# Patient Record
Sex: FEMALE | Race: BLACK OR AFRICAN AMERICAN
[De-identification: names, ages, dates, MRNs, and addresses within clinical notes are randomized per-mention and may not be internally consistent; named-entity substitution may affect disease eponyms.]

---

## 2017-09-08 ENCOUNTER — HOSPITAL ENCOUNTER (INPATIENT)
Dept: HOSPITAL 84 - D.OPS | Age: 82
LOS: 1 days | Discharge: HOME | DRG: 252 | End: 2017-09-09
Attending: INTERNAL MEDICINE | Admitting: INTERNAL MEDICINE
Payer: MEDICARE

## 2017-09-08 VITALS
HEIGHT: 61 IN | BODY MASS INDEX: 31.53 KG/M2 | HEIGHT: 61 IN | BODY MASS INDEX: 31.53 KG/M2 | BODY MASS INDEX: 31.53 KG/M2 | WEIGHT: 167 LBS | WEIGHT: 167 LBS

## 2017-09-08 DIAGNOSIS — D64.9: ICD-10-CM

## 2017-09-08 DIAGNOSIS — E11.22: ICD-10-CM

## 2017-09-08 DIAGNOSIS — N18.6: ICD-10-CM
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
## 2017-09-08 DIAGNOSIS — Z87.891: ICD-10-CM

## 2017-09-08 DIAGNOSIS — Z99.2: ICD-10-CM

## 2017-09-08 DIAGNOSIS — I12.0: ICD-10-CM

## 2017-09-08 DIAGNOSIS — T82.868A: Primary | ICD-10-CM

## 2017-09-08 LAB
ANION GAP SERPL CALC-SCNC: 18 MMOL/L (ref 8–16)
APTT BLD: 28.9 SECONDS (ref 22.8–39.4)
BASOPHILS NFR BLD AUTO: 0.5 % (ref 0–2)
BUN SERPL-MCNC: 32 MG/DL (ref 7–18)
CALCIUM SERPL-MCNC: 8.4 MG/DL (ref 8.5–10.1)
CHLORIDE SERPL-SCNC: 102 MMOL/L (ref 98–107)
CO2 SERPL-SCNC: 20.2 MMOL/L (ref 21–32)
CREAT SERPL-MCNC: 4.8 MG/DL (ref 0.6–1.3)
EOSINOPHIL NFR BLD: 3.8 % (ref 0–7)
ERYTHROCYTE [DISTWIDTH] IN BLOOD BY AUTOMATED COUNT: 16.1 % (ref 11.5–14.5)
GLUCOSE SERPL-MCNC: 85 MG/DL (ref 74–106)
HCT VFR BLD CALC: 36.9 % (ref 36–48)
HGB BLD-MCNC: 11.7 G/DL (ref 12–16)
IMM GRANULOCYTES NFR BLD: 0.2 % (ref 0–5)
INR PPP: 0.94 (ref 0.85–1.17)
LYMPHOCYTES NFR BLD AUTO: 40.4 % (ref 15–50)
MCH RBC QN AUTO: 29.4 PG (ref 26–34)
MCHC RBC AUTO-ENTMCNC: 31.7 G/DL (ref 31–37)
MCV RBC: 92.7 FL (ref 80–100)
MONOCYTES NFR BLD: 4.3 % (ref 2–11)
NEUTROPHILS NFR BLD AUTO: 50.8 % (ref 40–80)
OSMOLALITY SERPL CALC.SUM OF ELEC: 277 MOSM/KG (ref 275–300)
PLATELET # BLD: 159 10X3/UL (ref 130–400)
PMV BLD AUTO: 10 FL (ref 7.4–10.4)
POTASSIUM SERPL-SCNC: 4.2 MMOL/L (ref 3.5–5.1)
PROTHROMBIN TIME: 12.4 SECONDS (ref 11.6–15)
RBC # BLD AUTO: 3.98 10X6/UL (ref 4–5.4)
SODIUM SERPL-SCNC: 136 MMOL/L (ref 136–145)
WBC # BLD AUTO: 6 10X3/UL (ref 4.8–10.8)

## 2017-09-08 PROCEDURE — 02HV33Z INSERTION OF INFUSION DEVICE INTO SUPERIOR VENA CAVA, PERCUTANEOUS APPROACH: ICD-10-PCS | Performed by: SURGERY

## 2017-09-08 PROCEDURE — B5131ZZ FLUOROSCOPY OF RIGHT JUGULAR VEINS USING LOW OSMOLAR CONTRAST: ICD-10-PCS | Performed by: SURGERY

## 2017-09-08 PROCEDURE — 03180JD BYPASS LEFT BRACHIAL ARTERY TO UPPER ARM VEIN WITH SYNTHETIC SUBSTITUTE, OPEN APPROACH: ICD-10-PCS | Performed by: SURGERY

## 2017-09-08 PROCEDURE — 037Y3ZZ DILATION OF UPPER ARTERY, PERCUTANEOUS APPROACH: ICD-10-PCS | Performed by: SURGERY

## 2017-09-08 PROCEDURE — 05743ZZ DILATION OF LEFT INNOMINATE VEIN, PERCUTANEOUS APPROACH: ICD-10-PCS | Performed by: SURGERY

## 2017-09-08 PROCEDURE — B5181ZZ FLUOROSCOPY OF SUPERIOR VENA CAVA USING LOW OSMOLAR CONTRAST: ICD-10-PCS | Performed by: SURGERY

## 2017-09-08 PROCEDURE — B548ZZA ULTRASONOGRAPHY OF SUPERIOR VENA CAVA, GUIDANCE: ICD-10-PCS | Performed by: SURGERY

## 2017-09-08 PROCEDURE — 03180KF BYPASS LEFT BRACHIAL ARTERY TO LOWER ARM VEIN WITH NONAUTOLOGOUS TISSUE SUBSTITUTE, OPEN APPROACH: ICD-10-PCS | Performed by: SURGERY

## 2017-09-08 PROCEDURE — 05PYX3Z REMOVAL OF INFUSION DEVICE FROM UPPER VEIN, EXTERNAL APPROACH: ICD-10-PCS | Performed by: SURGERY

## 2017-09-09 VITALS — SYSTOLIC BLOOD PRESSURE: 142 MMHG | DIASTOLIC BLOOD PRESSURE: 67 MMHG

## 2017-09-09 VITALS — SYSTOLIC BLOOD PRESSURE: 140 MMHG | DIASTOLIC BLOOD PRESSURE: 48 MMHG

## 2017-09-09 PROCEDURE — 5A1D00Z: ICD-10-PCS | Performed by: INTERNAL MEDICINE

## 2017-11-04 ENCOUNTER — HOSPITAL ENCOUNTER (INPATIENT)
Dept: HOSPITAL 84 - D.M2 | Age: 82
LOS: 3 days | Discharge: HOME | DRG: 252 | End: 2017-11-07
Attending: INTERNAL MEDICINE | Admitting: INTERNAL MEDICINE
Payer: MEDICARE

## 2017-11-04 VITALS
WEIGHT: 174 LBS | WEIGHT: 174 LBS | BODY MASS INDEX: 32.85 KG/M2 | BODY MASS INDEX: 32.85 KG/M2 | BODY MASS INDEX: 32.85 KG/M2 | HEIGHT: 61 IN | HEIGHT: 61 IN

## 2017-11-04 DIAGNOSIS — E11.22: ICD-10-CM

## 2017-11-04 DIAGNOSIS — Z87.891: ICD-10-CM

## 2017-11-04 DIAGNOSIS — I12.0: ICD-10-CM

## 2017-11-04 DIAGNOSIS — T82.868A: Primary | ICD-10-CM

## 2017-11-04 DIAGNOSIS — Z99.2: ICD-10-CM
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
## 2017-11-04 DIAGNOSIS — N18.6: ICD-10-CM

## 2017-11-05 VITALS — DIASTOLIC BLOOD PRESSURE: 76 MMHG | SYSTOLIC BLOOD PRESSURE: 169 MMHG

## 2017-11-05 VITALS — SYSTOLIC BLOOD PRESSURE: 168 MMHG | DIASTOLIC BLOOD PRESSURE: 74 MMHG

## 2017-11-05 VITALS — DIASTOLIC BLOOD PRESSURE: 53 MMHG | SYSTOLIC BLOOD PRESSURE: 167 MMHG

## 2017-11-05 VITALS — DIASTOLIC BLOOD PRESSURE: 64 MMHG | SYSTOLIC BLOOD PRESSURE: 150 MMHG

## 2017-11-05 VITALS — DIASTOLIC BLOOD PRESSURE: 71 MMHG | SYSTOLIC BLOOD PRESSURE: 160 MMHG

## 2017-11-05 LAB
ANION GAP SERPL CALC-SCNC: 21.7 MMOL/L (ref 8–16)
APTT BLD: 31.6 SECONDS (ref 22.8–39.4)
BASOPHILS NFR BLD AUTO: 0.2 % (ref 0–2)
BUN SERPL-MCNC: 66 MG/DL (ref 7–18)
CALCIUM SERPL-MCNC: 8.3 MG/DL (ref 8.5–10.1)
CHLORIDE SERPL-SCNC: 102 MMOL/L (ref 98–107)
CO2 SERPL-SCNC: 17.9 MMOL/L (ref 21–32)
CREAT SERPL-MCNC: 8 MG/DL (ref 0.6–1.3)
EOSINOPHIL NFR BLD: 5.9 % (ref 0–7)
ERYTHROCYTE [DISTWIDTH] IN BLOOD BY AUTOMATED COUNT: 19.4 % (ref 11.5–14.5)
GLUCOSE SERPL-MCNC: 74 MG/DL (ref 74–106)
HCT VFR BLD CALC: 29.8 % (ref 36–48)
HGB BLD-MCNC: 9.8 G/DL (ref 12–16)
IMM GRANULOCYTES NFR BLD: 0.2 % (ref 0–5)
INR PPP: 1.01 (ref 0.85–1.17)
LYMPHOCYTES NFR BLD AUTO: 32.7 % (ref 15–50)
MCH RBC QN AUTO: 31.2 PG (ref 26–34)
MCHC RBC AUTO-ENTMCNC: 32.9 G/DL (ref 31–37)
MCV RBC: 94.9 FL (ref 80–100)
MONOCYTES NFR BLD: 7.5 % (ref 2–11)
NEUTROPHILS NFR BLD AUTO: 53.5 % (ref 40–80)
OSMOLALITY SERPL CALC.SUM OF ELEC: 291 MOSM/KG (ref 275–300)
PLATELET # BLD: 193 10X3/UL (ref 130–400)
PMV BLD AUTO: 9.5 FL (ref 7.4–10.4)
POTASSIUM SERPL-SCNC: 4.6 MMOL/L (ref 3.5–5.1)
PROTHROMBIN TIME: 13.2 SECONDS (ref 11.6–15)
RBC # BLD AUTO: 3.14 10X6/UL (ref 4–5.4)
SODIUM SERPL-SCNC: 137 MMOL/L (ref 136–145)
WBC # BLD AUTO: 8.3 10X3/UL (ref 4.8–10.8)

## 2017-11-05 PROCEDURE — B31J1ZZ FLUOROSCOPY OF LEFT UPPER EXTREMITY ARTERIES USING LOW OSMOLAR CONTRAST: ICD-10-PCS | Performed by: SURGERY

## 2017-11-05 PROCEDURE — B51W1ZZ FLUOROSCOPY OF DIALYSIS SHUNT/FISTULA USING LOW OSMOLAR CONTRAST: ICD-10-PCS | Performed by: SURGERY

## 2017-11-05 PROCEDURE — 03C83ZZ EXTIRPATION OF MATTER FROM LEFT BRACHIAL ARTERY, PERCUTANEOUS APPROACH: ICD-10-PCS | Performed by: SURGERY

## 2017-11-05 PROCEDURE — 03783ZZ DILATION OF LEFT BRACHIAL ARTERY, PERCUTANEOUS APPROACH: ICD-10-PCS | Performed by: SURGERY

## 2017-11-05 NOTE — NUR
RECEIVED REPORT, WILL ASSUME CARE OF PT, PT DENIES ANY NEEDS AT THIS TIME, BED
IS LOW, SRX2, SCD ARE ON, WILL CONTINUE PLAN OF CARE

## 2017-11-05 NOTE — NUR
RECIEVED REPORT ON PATIENT, PATIENT IS SLEEPING AT THIS TIME, NAD NOTED. CHEST
RISES AND FALLS EQUALLY. BED IS LOW AND LOCKED AT THIS TIME. CALL LIGHT IN
[facility]. CPOC

## 2017-11-05 NOTE — NUR
SPOKE WITH CHANDRIKA WEEMS ABOUT RIGHT ARM, SHE VERBALIZED THAT THE ARM IS OK TO
USE ON BLOOD DRAWS AND IV ACCESS. PT VERBALIZES UNDERSTANDING AND DENIES ANY
QUESTIONS OR CONCERNS. CHANDRIKA ALSO GAVE ORDERS TO DRAW A PT AND INR, AND APTT
ALSO SAID COZAAR 100MG PO DAILY AND LOPRESSOR 50 MG BID COULD BE PUT IN. WILL
CPOC

## 2017-11-05 NOTE — NUR
PATIENT VISITING WITH FAMILY. PATIENT DENIES ANY NEEDS AT THIS TIME. BED IS
LOW AND LOCKED. STATES PAIN IS OKAY RIGHT NOW INFORMED PATIENT I HAVE PAGED
THE APN WAITING ON CALL BACK FOR PAIN MEDICATION. CPOC

## 2017-11-05 NOTE — NUR
PATIENT BACK FROM SURGERY, VS: /73 HR 53, O2SAT 96% ON ROOM AIR, RR 14,
TEMP 97.0. PATIENT C/O PAIN IN L ARM 10/10. WILL GIVE PAIN MEDICATION. CPOC

## 2017-11-05 NOTE — NUR
PT ARRIVED VIA WHEELCHAIR WITH FAMILY. PUT AMBULATE TO BED. GAIT UNSTEADY. PT
LEANING OVER FOR SUPPORT OF BED. PT IS AAO. A RESERVE LEFT ARM SIGN HUNG ABOVE
BED. PT IS NPO AND VERBALIZES UNDERSTANDING. PT DENIES ANY NEEDS. PT RESTING
IN BED. NO S/S OF DISTRESS. WILL CPOC

## 2017-11-05 NOTE — NUR
PAGED THE APN ON CALL TO ASK ABOUT ORDERS AND TO CLARIFY ON WEATHER OR NOT I
CAN USE RIGHT ARM FOR BLOOD DRAWS AND IV ACCESS. PT STATES THERE IS A FISTULA
IN THAT UPPER ARM, LET LAB DRAW FROM HAND BUT WOULD NOT ALLOW ABOVE THE WRIST.
WILL CPOC

## 2017-11-06 VITALS — SYSTOLIC BLOOD PRESSURE: 149 MMHG | DIASTOLIC BLOOD PRESSURE: 49 MMHG

## 2017-11-06 VITALS — DIASTOLIC BLOOD PRESSURE: 56 MMHG | SYSTOLIC BLOOD PRESSURE: 145 MMHG

## 2017-11-06 VITALS — DIASTOLIC BLOOD PRESSURE: 59 MMHG | SYSTOLIC BLOOD PRESSURE: 151 MMHG

## 2017-11-06 VITALS — DIASTOLIC BLOOD PRESSURE: 60 MMHG | SYSTOLIC BLOOD PRESSURE: 144 MMHG

## 2017-11-06 VITALS — SYSTOLIC BLOOD PRESSURE: 147 MMHG | DIASTOLIC BLOOD PRESSURE: 58 MMHG

## 2017-11-06 PROCEDURE — 5A1D70Z PERFORMANCE OF URINARY FILTRATION, INTERMITTENT, LESS THAN 6 HOURS PER DAY: ICD-10-PCS | Performed by: INTERNAL MEDICINE

## 2017-11-06 NOTE — NUR
RECIEVED REPORT FROM MONIKA IN DIALYSIS, PULLED OFF 1 LITER, VITALS REPORTED TO
BE STABLE.  NOTIFIED CNAs THAT PT IS READY TO COME BACK TO THE FLOOR, RAGHU LEFT FLOOR TO RETIEVE PT.

## 2017-11-06 NOTE — NUR
TYLENOL 650 MG GIVEN AT PT REQUEST FOR C/O GERNERALIZED DISCOMFORT.  RATES
PAIN AT A  5 ON PAIN SCALE,  BED LOW, CL IN REACH, RESPOSITIONED IN BED FOR
COMFORT.

## 2017-11-06 NOTE — NUR
PATIENT SITTING ON SIDE OF BED WITH LUNCH TRAY. STATES BACK FEELS "MUCH
BETTER".  NO COMPLAINTS OR REQUESTS AT THIS TIME. CPOC.

## 2017-11-06 NOTE — NUR
PATIENT RESTING IN BED. UPSET ABOUT NOT HAVING DIALYSIS YET. EXPLAINED SHE
WILL HAVE DIALYSIS THIS AFTERNOON. STATES SHE WANTS TO GO HOME. ALL QUESTIONS
ANSWERED. NO OTHER REQUESTS AT THIS TIME. CALL LIGHT IN REACH. CPOC.

## 2017-11-06 NOTE — NUR
RECIEVED REPORT ON PATIENT, PATIENT IS ALERT AND ORIENTED AT THIS TIME.
PATIENT HAS A R HAND IV THAT IS SL AT THIS TIME. PATIENT HAS A L AVF WITH
THRILL AND BRUIT NOTED. BED IS LOW AND LOCKED AT THIS TIME. CPOC

## 2017-11-06 NOTE — NUR
PATIENT COMPLAINED OF BACK PAIN AND REQUEST TO SIT UP ON SIDE OF BED. TYLENOL
GIVEN. WILL CONTINUE TO MONITOR. CPOC.

## 2017-11-07 VITALS — DIASTOLIC BLOOD PRESSURE: 74 MMHG | SYSTOLIC BLOOD PRESSURE: 172 MMHG

## 2017-11-07 VITALS — DIASTOLIC BLOOD PRESSURE: 71 MMHG | SYSTOLIC BLOOD PRESSURE: 122 MMHG

## 2017-11-07 VITALS — DIASTOLIC BLOOD PRESSURE: 54 MMHG | SYSTOLIC BLOOD PRESSURE: 147 MMHG

## 2017-11-07 VITALS — SYSTOLIC BLOOD PRESSURE: 142 MMHG | DIASTOLIC BLOOD PRESSURE: 57 MMHG

## 2017-11-07 LAB — HCV AB S/CO SERPL IA: <0.1 (ref 0–0.9)

## 2017-11-07 NOTE — NUR
AM ROUNDS - PT IS IN BED AND IS AWAKE AT THIS TIME.  PT IS HARD OF HEARING.
RESERVE AT LEFT ARM, AVF.  IV TO RIGHT HAND, SL.  PT IS ON ROOM AIR.  INCONT
OF B&B.  STILL PRODUCES URINE.  PT IS UP WITH ASSIST.  NO NEEDS AT THIS TIME.
BED AT LOWEST POSITION.  CALL BELL IN USE/REACH.  SIDE RAILS UP X2.  WILL
CONTINUE TO MONITOR

## 2017-11-07 NOTE — NUR
D/C - WRITTEN AND VERBAL D/C GIVEN TO PT.  IV TO RIGTH HAND D/C, CATH TIP
INTACT, 2X2 DRESSING APPLIED AND SECURED WITH TAPE.  PT TOLERATED WELL.  PT
LEFT FLOOR VIA WHEELCHAIR WITH RN.  WILL D/C

## 2017-11-17 ENCOUNTER — HOSPITAL ENCOUNTER (INPATIENT)
Dept: HOSPITAL 84 - D.OPS | Age: 82
LOS: 3 days | Discharge: HOME | DRG: 252 | End: 2017-11-20
Attending: INTERNAL MEDICINE | Admitting: INTERNAL MEDICINE
Payer: MEDICARE

## 2017-11-17 VITALS
WEIGHT: 192.65 LBS | BODY MASS INDEX: 36.37 KG/M2 | BODY MASS INDEX: 36.37 KG/M2 | BODY MASS INDEX: 36.37 KG/M2 | HEIGHT: 61 IN | WEIGHT: 192.65 LBS | HEIGHT: 61 IN | BODY MASS INDEX: 36.37 KG/M2

## 2017-11-17 VITALS — DIASTOLIC BLOOD PRESSURE: 66 MMHG | SYSTOLIC BLOOD PRESSURE: 144 MMHG

## 2017-11-17 VITALS — DIASTOLIC BLOOD PRESSURE: 86 MMHG | SYSTOLIC BLOOD PRESSURE: 190 MMHG

## 2017-11-17 VITALS — DIASTOLIC BLOOD PRESSURE: 74 MMHG | SYSTOLIC BLOOD PRESSURE: 153 MMHG

## 2017-11-17 VITALS — SYSTOLIC BLOOD PRESSURE: 191 MMHG | DIASTOLIC BLOOD PRESSURE: 89 MMHG

## 2017-11-17 DIAGNOSIS — E11.22: ICD-10-CM

## 2017-11-17 DIAGNOSIS — N18.6: ICD-10-CM

## 2017-11-17 DIAGNOSIS — Z87.891: ICD-10-CM
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
## 2017-11-17 DIAGNOSIS — T82.868A: Primary | ICD-10-CM

## 2017-11-17 DIAGNOSIS — Z99.2: ICD-10-CM

## 2017-11-17 LAB
ANION GAP SERPL CALC-SCNC: 23.9 MMOL/L (ref 8–16)
APTT BLD: 30.4 SECONDS (ref 22.8–39.4)
BASOPHILS NFR BLD AUTO: 0.2 % (ref 0–2)
BUN SERPL-MCNC: 64 MG/DL (ref 7–18)
CALCIUM SERPL-MCNC: 7.7 MG/DL (ref 8.5–10.1)
CHLORIDE SERPL-SCNC: 105 MMOL/L (ref 98–107)
CO2 SERPL-SCNC: 20.1 MMOL/L (ref 21–32)
CREAT SERPL-MCNC: 7.9 MG/DL (ref 0.6–1.3)
EOSINOPHIL NFR BLD: 7.1 % (ref 0–7)
ERYTHROCYTE [DISTWIDTH] IN BLOOD BY AUTOMATED COUNT: 22 % (ref 11.5–14.5)
GLUCOSE SERPL-MCNC: 86 MG/DL (ref 74–106)
HCT VFR BLD CALC: 27.4 % (ref 36–48)
HGB BLD-MCNC: 9.7 G/DL (ref 12–16)
IMM GRANULOCYTES NFR BLD: 0.2 % (ref 0–5)
INR PPP: 0.97 (ref 0.85–1.17)
LYMPHOCYTES NFR BLD AUTO: 27.8 % (ref 15–50)
MCH RBC QN AUTO: 35.9 PG (ref 26–34)
MCHC RBC AUTO-ENTMCNC: 35.4 G/DL (ref 31–37)
MCV RBC: 101.5 FL (ref 80–100)
MONOCYTES NFR BLD: 5.6 % (ref 2–11)
NEUTROPHILS NFR BLD AUTO: 59.1 % (ref 40–80)
OSMOLALITY SERPL CALC.SUM OF ELEC: 303 MOSM/KG (ref 275–300)
PLATELET # BLD: 233 10X3/UL (ref 130–400)
PMV BLD AUTO: 9.2 FL (ref 7.4–10.4)
POTASSIUM SERPL-SCNC: 5 MMOL/L (ref 3.5–5.1)
PROTHROMBIN TIME: 12.8 SECONDS (ref 11.6–15)
RBC # BLD AUTO: 2.7 10X6/UL (ref 4–5.4)
SODIUM SERPL-SCNC: 144 MMOL/L (ref 136–145)
WBC # BLD AUTO: 5.9 10X3/UL (ref 4.8–10.8)

## 2017-11-17 PROCEDURE — 3E03317 INTRODUCTION OF OTHER THROMBOLYTIC INTO PERIPHERAL VEIN, PERCUTANEOUS APPROACH: ICD-10-PCS | Performed by: SURGERY

## 2017-11-17 PROCEDURE — 03783ZZ DILATION OF LEFT BRACHIAL ARTERY, PERCUTANEOUS APPROACH: ICD-10-PCS | Performed by: SURGERY

## 2017-11-17 PROCEDURE — 03C83ZZ EXTIRPATION OF MATTER FROM LEFT BRACHIAL ARTERY, PERCUTANEOUS APPROACH: ICD-10-PCS | Performed by: SURGERY

## 2017-11-17 NOTE — NUR
PT IN BED RESTING QUIETLY. C/O PAIN. TOLD PT I WILL BRING HER PRN TYLENOL.
BREATHING EVEN AND UNLABORED. BED IN LOW POSITION, CALL LIGHT WITHIN REACH.

## 2017-11-18 VITALS — SYSTOLIC BLOOD PRESSURE: 150 MMHG | DIASTOLIC BLOOD PRESSURE: 61 MMHG

## 2017-11-18 VITALS — SYSTOLIC BLOOD PRESSURE: 152 MMHG | DIASTOLIC BLOOD PRESSURE: 54 MMHG

## 2017-11-18 VITALS — SYSTOLIC BLOOD PRESSURE: 154 MMHG | DIASTOLIC BLOOD PRESSURE: 65 MMHG

## 2017-11-18 VITALS — SYSTOLIC BLOOD PRESSURE: 170 MMHG | DIASTOLIC BLOOD PRESSURE: 70 MMHG

## 2017-11-18 VITALS — SYSTOLIC BLOOD PRESSURE: 95 MMHG | DIASTOLIC BLOOD PRESSURE: 44 MMHG

## 2017-11-18 VITALS — DIASTOLIC BLOOD PRESSURE: 77 MMHG | SYSTOLIC BLOOD PRESSURE: 177 MMHG

## 2017-11-18 PROCEDURE — 5A1D70Z PERFORMANCE OF URINARY FILTRATION, INTERMITTENT, LESS THAN 6 HOURS PER DAY: ICD-10-PCS | Performed by: INTERNAL MEDICINE

## 2017-11-18 NOTE — NUR
PATIENT ALERT/ORIENT X4. OXYGEN ON AT 3L PER N/C. FISTULA TO LEFT ARM. RESERVE
LEFT ARM. RIGHT FOREARM SALINE LOCKED. PATIENT IS VERY [place]. CALL LIGHT WITHIN
REACH.

## 2017-11-18 NOTE — NUR
PT ALERT/ORIENTED AND RESTING IN BED. HER HOME MEDS HAVE NOT BEEN ASSESED YET
BY MD. SHE IS WANTING TO TAKE HER PILLS THAT SHE BROUGHT FROM HOME. REVIEWED
MEDS AND AGREED THAT PT COULD TAKE HER LOPRESSOR AND NORKO TOL[place] AND DR LAO WILL BE MADE AWARE OF MEDS NEEDING TO BE REVIEWED IN AM. PT IS VERY
[place]. SHE DID DIALYZE TODAY AND HAS A AVF TO [place]. SHE STILL MAKES SOME URINE
AND IS OFTEN INCONTINENT OF BM. CPOC. F/U ON HOME MEDS IN AM WITH SUGEY
NEPHROLOGIST.

## 2017-11-19 VITALS — DIASTOLIC BLOOD PRESSURE: 63 MMHG | SYSTOLIC BLOOD PRESSURE: 117 MMHG

## 2017-11-19 VITALS — SYSTOLIC BLOOD PRESSURE: 163 MMHG | DIASTOLIC BLOOD PRESSURE: 69 MMHG

## 2017-11-19 VITALS — DIASTOLIC BLOOD PRESSURE: 65 MMHG | SYSTOLIC BLOOD PRESSURE: 159 MMHG

## 2017-11-19 VITALS — DIASTOLIC BLOOD PRESSURE: 68 MMHG | SYSTOLIC BLOOD PRESSURE: 177 MMHG

## 2017-11-19 VITALS — DIASTOLIC BLOOD PRESSURE: 58 MMHG | SYSTOLIC BLOOD PRESSURE: 103 MMHG

## 2017-11-19 VITALS — DIASTOLIC BLOOD PRESSURE: 74 MMHG | SYSTOLIC BLOOD PRESSURE: 174 MMHG

## 2017-11-19 NOTE — OP
PATIENT NAME:  JIAN BERMUDEZ                            MEDICAL RECORD: [id number]
:35                                             LOCATION:D.     D.[id number]
                                                         ADMISSION DATE:17
SURGEON:  FAUSTINA WRIGHT MD            
 
 
DATE OF OPERATION:  2017
 
REFERRING PHYSICIAN:  Venus Hughes MD, of [place]
 
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
 
SURGEON:  Faustina Wright MD
 
OPERATION PERFORMED:  Percutaneous angiogram with AngioJet mechanical
thrombolysis, tPA pharmacothrombolysis, selective brachial artery arteriogram,
and balloon angioplasty of arterial anastomotic stenosis.
 
PREOPERATIVE NOTE:  Ms. Bermudez is a very nice 82-year-old -American
female from [place], [place], who has end-stage renal disease and is on
chronic hemodialysis.  She has been dialyzing for a while now with left upper
extremity HeRO graft.  Only last week, I performed a mechanical thrombolysis and
angioplasty procedure to reopen her thrombosed graft.  She dialyzed twice after
that down at [place]; but then, when she presented for dialysis yesterday, it
had thrombosed again.  When she went home from the hospital last time, it was my
intention that she be on Plavix, I have not had confirmation that she has been. 
If she has, I think we are going to need to add another anticoagulant such as
Coumadin; and if not, we just need to make sure that she is on Plavix this time.
 
DESCRIPTION OF PROCEDURE:  Under general anesthesia with an LMA per CRNA, the
patient was prepped and draped in sterile manner.  The Acuseal graft in her arm
was accessed with micropuncture technique.  Two opposing 6-Mozambican introducers
were placed in the body of the graft.  A Glidewire was passed through the
arterial limb and arterial anastomosis into the brachial artery and a glide
catheter followed.  Selective brachial arteriogram then demonstrated no evidence
of thrombus or occlusion of the brachial artery or branches, but there was a
high-grade stenosis at the arterial anastomosis.  I then used the AngioJet
catheter to lyse thrombus within the juxta-anastomotic segment.  The patient was
systemically heparinized with 5000 units of heparin and 2 mg of Activase was
administered into the juxta-anastomotic segment.  After sufficient dwell time,
the contents were aspirated.  I then used a 5-mm diameter high-pressure
angioplasty balloon to dilate the arterial anastomosis.  The arterial
anastomosis was dilated and the balloon was left inflated for 2 minutes, during
which time I used an AngioJet catheter and a Rajesh catheter to remove and lyse
thrombus from within the body of the HeRO graft and the outflow device all the
way to the right atrium.  When the angioplasty balloon was deflated, flow was
restored in the graft.  Repeat contrast injections with also repeat selective
brachial artery arteriogram showed no evidence of residual thrombus and
excellent flow in the access.  The patient's heparin was not reversed.  The
6-Mozambican introducers were removed and hemostasis was obtained at those sites
with some direct pressure and 4-0 Prolene figure-of-eight skin sutures.  Over
that, sterile dressings were applied and the patient was awakened from
anesthetic with a functioning AV graft and was taken to the recovery room.
 
In the recovery room, she will be given 150 mg of Plavix orally and will be
getting 75 mg of Plavix orally q.a.m. as of tomorrow morning.  She will need to
 
 
 
OPERATIVE REPORT                               [id number]    JIAN BERMUDEZ          
 
 
stay in the hospital overnight.  She will need to have dialysis here at [facility], most definitely either this evening or tomorrow, certainly before she
can go home to [place].
 
There was no blood loss during the procedure other than what was aspirated to
remove clot and debris.  About 50 cc was aspirated and discarded.  All sponges,
instruments, and needles were accounted for.  No drain was used and no surgical
specimen was submitted for histopathology.
 
TRANSINT:[id number] Voice Confirmation ID: [id number] DOCUMENT ID: [id number]
                                           
                                           FAUSTINA WRIGHT MD            
 
 
 
Electronically Signed by FAUSTINA WRIGHT on 17 at 1307
 
 
 
 
 
 
 
 
 
 
 
 
 
 
 
 
 
 
 
 
 
 
 
 
 
 
 
 
 
 
 
CC: VENUS HUGHES III MD                                       [id number]
DICTATION DATE: 17 1518     :     17 1651      ADM IN  
                                                                              
[facility]                                          
[address] [facility], [facility]

## 2017-11-19 NOTE — NUR
REPORT RECEIVED. RR EVEN AND UNALBORED, PT EXPRESSED WANT TO GO HOME TODAY.
DENIES FURTHER NEEDS, WILL CTM.

## 2017-11-19 NOTE — NUR
PT IN BED TALKING ON PHONE. RIGHT FA SALINE LOCKED. LEFT ARM RESERVE FOR
FISTULA. ORDERS FOR POSSIBLE DISCAHRGE IF OK WITH RENAL. DENIES PAIN OR
DISCOMFORT. WILL CONT TO MONITOR.

## 2017-11-20 VITALS — SYSTOLIC BLOOD PRESSURE: 159 MMHG | DIASTOLIC BLOOD PRESSURE: 71 MMHG

## 2017-11-20 VITALS — DIASTOLIC BLOOD PRESSURE: 71 MMHG | SYSTOLIC BLOOD PRESSURE: 157 MMHG

## 2017-11-20 VITALS — DIASTOLIC BLOOD PRESSURE: 64 MMHG | SYSTOLIC BLOOD PRESSURE: 156 MMHG

## 2017-11-20 VITALS — SYSTOLIC BLOOD PRESSURE: 154 MMHG | DIASTOLIC BLOOD PRESSURE: 58 MMHG

## 2017-11-20 NOTE — NUR
Patient Name: JIAN BERMUDEZ Admission Status: Elective
Accout number: [id number] Admission Date: 2017
: 1935 Admission Diagnosis:
Attending: AVILA HENDERSON Current LOS: 3
 
Anticipated DC Date: 2017
Planned Disposition: Home
Primary Insurance: MEDICARE A & B
 
 
Discharge Planning Comments:
* Is the patient Alert and Oriented? Yes 0
* How many steps to enter\exit or inside your home? NONE 0
* PCP DR. JESS CERVANTES, [place] 0
* Pharmacy [facility] IN [place] 0
* Preadmission Environment Home with Family 0
* ADLs Independent 0
* Equipment Bedside Commode
Rolling Walker
Shower Chair 0
* Other Equipment [place] [employer], - MEDICAL EQUIPMENT PROVIDER 0
* List name and contact numbers for known caregivers / representatives who
currently or will assist patient after discharge: WHITNEY COFFMAN, DTR,
[phone]
[employer], DTR, [phone] 0
* Community resources currently utilized Other 0
* Please name any agencies selected above. OUTPATIENT DIALYSIS, [place]
DIALYSIS IN [facility], 0630AM SCAT TRANSPORT ($6 ROUND TRIP) 0
* Additional services required to return to the preadmission environment? No 0
 
* Can the patient safely return to the preadmission environment? Yes 0
* Has this patient been hospitalized within the prior 30 days at any hospital?
Yes 0
 
CM MET WITH PT IN ROOM TO DISCUSS DISCHARGE PLANNING AND NEEDS. PT REPORTS
LIVING AT HOME INDEPENDENTLY WITH HER SPOUSE. PT HAS BEDSIDE COMMODE, ROLLING
WALKER AND SHOWER CHAIR FROM [place] [employer]. PT HAS NO OUTSIDE
SERVICES ASSISTING IN THE HOME. PT ATTENDS OUTPATIENT DIALYSIS IN [facility], 0630AM, SCAT BUS TRANSPORTATION. CM DISCUSSED AVAILABILITY OF HOME
HEALTH, REHAB SERVICES AND MEDICAL EQUIPMENT. PT DENIES DISCHARGE NEEDS,
REPORTS HER GRANDSON WILL PICK HER UP FOR DISCHARGE HOME. IMPORTANT MESSAGE
FROM MEDICARE PROVIDED AND EXPLAINED.
 
 
: Giuseppe Lambert

## 2017-11-20 NOTE — NUR
RECEIVED CALLBACK FROM LANDON CLEMENTE (WITH RENAL). LANDON CLEMENTE INFORMED OF DR. WILLIS D/C ORDER IN YESTERDAY (IF OK WITH NEPHROLOGY). LANDON CLEMENTE STATES DR. HENDERSON WILL BE BY AND TO CATCH HIM REGARDING THAT.

## 2017-11-20 NOTE — NUR
PER NIGHT SHIFT MATTHEW, DR. LAO HAS BEEN CALLED BY TWO DIFFERNT NURSES
REGARDING RESTARTING HOME MEDICATIONS AND DR. LAO HAS STATED TO NURSES
THAT HE WILL ADDRESS IT WHEN HE COMES IN (REFFERING TO THIS PAST WEEKEND). PER
MATTHEW, MEDICATIONS HAVE NOT BEEN RESTARTED STILL AND PT HAS BEEN TAKING OWN
MEDICAITONS. PT IS TO POSSIBLY D/C TODAY. WILL ADDRESS THIS WITH RENAL ON
CALL.

## 2017-11-20 NOTE — NUR
D/C INSTRUCTIONS EXPLAINED TO PT. D/C PAPERWORK SIGNED BY PT AND PLACED IN
CHART. IV TO RIGHT FOREARM REMOVED WITH CATH TIP INTACT. COVERED SITE WITH 2X2
GAUZE AND SECURED WITH TEGADERM (DUE TO BLEEDING, PRESSURE HELD FIRMLY).
ALEXANDER NORRIS ASSISTED PT GETTING DRESSED. PT D/C VIA WHEELCHAIR. GRANDSON IS
DRIVING PT HOME.

## 2017-11-20 NOTE — NUR
AM ROUNDING- RECEIVED REPORT FROM NIGHT SHIFT NURSE MATTHEW. PT IS CURRENTLY
LAYING IN BED ON BACK WITH EYES OPEN RESTING. PT IS HARD OF HEARING. ON ROOM
AIR. NO MONITOR. IV SEEN TO RIGHT FOREARM THAT IS CURRENTLY SALINE LOCKED.
RESERVE LEFT ARM FOR AVF. NO NEED AT THIS CURRENT TIME. WILL CONTINUE TO
MONITOR AND CONTINUE WITH PLAN OF CARE.

## 2018-12-16 ENCOUNTER — HOSPITAL ENCOUNTER (INPATIENT)
Dept: HOSPITAL 84 - D.ER | Age: 83
LOS: 6 days | Discharge: HOME | DRG: 252 | End: 2018-12-22
Attending: INTERNAL MEDICINE | Admitting: INTERNAL MEDICINE
Payer: MEDICARE

## 2018-12-16 VITALS — DIASTOLIC BLOOD PRESSURE: 92 MMHG | SYSTOLIC BLOOD PRESSURE: 150 MMHG

## 2018-12-16 VITALS
HEIGHT: 61 IN | BODY MASS INDEX: 34.69 KG/M2 | WEIGHT: 183.76 LBS | BODY MASS INDEX: 34.69 KG/M2 | HEIGHT: 61 IN | BODY MASS INDEX: 34.69 KG/M2 | WEIGHT: 183.76 LBS

## 2018-12-16 DIAGNOSIS — T82.868A: Primary | ICD-10-CM

## 2018-12-16 DIAGNOSIS — N18.6: ICD-10-CM

## 2018-12-16 DIAGNOSIS — I12.0: ICD-10-CM
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
## 2018-12-16 DIAGNOSIS — E11.22: ICD-10-CM

## 2018-12-16 LAB
ALBUMIN SERPL-MCNC: 3.7 G/DL (ref 3.4–5)
ALP SERPL-CCNC: 67 U/L (ref 46–116)
ALT SERPL-CCNC: 9 U/L (ref 10–68)
ANION GAP SERPL CALC-SCNC: 22 MMOL/L (ref 8–16)
BASOPHILS NFR BLD AUTO: 0.3 % (ref 0–2)
BILIRUB SERPL-MCNC: 0.37 MG/DL (ref 0.2–1.3)
BUN SERPL-MCNC: 66 MG/DL (ref 7–18)
CALCIUM SERPL-MCNC: 8.5 MG/DL (ref 8.5–10.1)
CHLORIDE SERPL-SCNC: 105 MMOL/L (ref 98–107)
CO2 SERPL-SCNC: 21.4 MMOL/L (ref 21–32)
CREAT SERPL-MCNC: 10.3 MG/DL (ref 0.6–1.3)
EOSINOPHIL NFR BLD: 4.8 % (ref 0–7)
ERYTHROCYTE [DISTWIDTH] IN BLOOD BY AUTOMATED COUNT: 18 % (ref 11.5–14.5)
GLOBULIN SER-MCNC: 3.7 G/L
GLUCOSE SERPL-MCNC: 94 MG/DL (ref 74–106)
HCT VFR BLD CALC: 35.3 % (ref 36–48)
HGB BLD-MCNC: 11.4 G/DL (ref 12–16)
IMM GRANULOCYTES NFR BLD: 0.4 % (ref 0–5)
LYMPHOCYTES NFR BLD AUTO: 23.9 % (ref 15–50)
MCH RBC QN AUTO: 30.2 PG (ref 26–34)
MCHC RBC AUTO-ENTMCNC: 32.3 G/DL (ref 31–37)
MCV RBC: 93.6 FL (ref 80–100)
MONOCYTES NFR BLD: 6.1 % (ref 2–11)
NEUTROPHILS NFR BLD AUTO: 64.5 % (ref 40–80)
OSMOLALITY SERPL CALC.SUM OF ELEC: 305 MOSM/KG (ref 275–300)
PLATELET # BLD: 188 10X3/UL (ref 130–400)
PMV BLD AUTO: 9.8 FL (ref 7.4–10.4)
POTASSIUM SERPL-SCNC: 4.4 MMOL/L (ref 3.5–5.1)
PROT SERPL-MCNC: 7.4 G/DL (ref 6.4–8.2)
RBC # BLD AUTO: 3.77 10X6/UL (ref 4–5.4)
SODIUM SERPL-SCNC: 144 MMOL/L (ref 136–145)
WBC # BLD AUTO: 9.9 10X3/UL (ref 4.8–10.8)

## 2018-12-17 VITALS — SYSTOLIC BLOOD PRESSURE: 143 MMHG | DIASTOLIC BLOOD PRESSURE: 74 MMHG

## 2018-12-17 VITALS — SYSTOLIC BLOOD PRESSURE: 175 MMHG | DIASTOLIC BLOOD PRESSURE: 87 MMHG

## 2018-12-17 VITALS — DIASTOLIC BLOOD PRESSURE: 66 MMHG | SYSTOLIC BLOOD PRESSURE: 128 MMHG

## 2018-12-17 VITALS — SYSTOLIC BLOOD PRESSURE: 139 MMHG | DIASTOLIC BLOOD PRESSURE: 63 MMHG

## 2018-12-17 VITALS — SYSTOLIC BLOOD PRESSURE: 141 MMHG | DIASTOLIC BLOOD PRESSURE: 62 MMHG

## 2018-12-17 VITALS — SYSTOLIC BLOOD PRESSURE: 139 MMHG | DIASTOLIC BLOOD PRESSURE: 69 MMHG

## 2018-12-17 LAB
ANION GAP SERPL CALC-SCNC: 21 MMOL/L (ref 8–16)
BASOPHILS NFR BLD AUTO: 0.1 % (ref 0–2)
BUN SERPL-MCNC: 70 MG/DL (ref 7–18)
CALCIUM SERPL-MCNC: 8 MG/DL (ref 8.5–10.1)
CHLORIDE SERPL-SCNC: 105 MMOL/L (ref 98–107)
CO2 SERPL-SCNC: 19.7 MMOL/L (ref 21–32)
CREAT SERPL-MCNC: 10.5 MG/DL (ref 0.6–1.3)
EOSINOPHIL NFR BLD: 4.5 % (ref 0–7)
ERYTHROCYTE [DISTWIDTH] IN BLOOD BY AUTOMATED COUNT: 18 % (ref 11.5–14.5)
GLUCOSE SERPL-MCNC: 74 MG/DL (ref 74–106)
HCT VFR BLD CALC: 33.6 % (ref 36–48)
HGB BLD-MCNC: 11.1 G/DL (ref 12–16)
IMM GRANULOCYTES NFR BLD: 0.5 % (ref 0–5)
INR PPP: 1.09 (ref 0.85–1.17)
LYMPHOCYTES NFR BLD AUTO: 14.8 % (ref 15–50)
MCH RBC QN AUTO: 30.2 PG (ref 26–34)
MCHC RBC AUTO-ENTMCNC: 33 G/DL (ref 31–37)
MCV RBC: 91.6 FL (ref 80–100)
MONOCYTES NFR BLD: 8.9 % (ref 2–11)
NEUTROPHILS NFR BLD AUTO: 71.2 % (ref 40–80)
OSMOLALITY SERPL CALC.SUM OF ELEC: 300 MOSM/KG (ref 275–300)
PLATELET # BLD: 162 10X3/UL (ref 130–400)
PMV BLD AUTO: 9.5 FL (ref 7.4–10.4)
POTASSIUM SERPL-SCNC: 4.7 MMOL/L (ref 3.5–5.1)
PROTHROMBIN TIME: 13.6 SECONDS (ref 11.6–15)
RBC # BLD AUTO: 3.67 10X6/UL (ref 4–5.4)
SODIUM SERPL-SCNC: 141 MMOL/L (ref 136–145)
WBC # BLD AUTO: 10 10X3/UL (ref 4.8–10.8)

## 2018-12-18 VITALS — DIASTOLIC BLOOD PRESSURE: 80 MMHG | SYSTOLIC BLOOD PRESSURE: 169 MMHG

## 2018-12-18 VITALS — DIASTOLIC BLOOD PRESSURE: 76 MMHG | SYSTOLIC BLOOD PRESSURE: 151 MMHG

## 2018-12-18 VITALS — DIASTOLIC BLOOD PRESSURE: 77 MMHG | SYSTOLIC BLOOD PRESSURE: 166 MMHG

## 2018-12-18 VITALS — DIASTOLIC BLOOD PRESSURE: 64 MMHG | SYSTOLIC BLOOD PRESSURE: 138 MMHG

## 2018-12-18 LAB
ALBUMIN SERPL-MCNC: 3.2 G/DL (ref 3.4–5)
ALP SERPL-CCNC: 59 U/L (ref 46–116)
ALT SERPL-CCNC: 8 U/L (ref 10–68)
ANION GAP SERPL CALC-SCNC: 23.1 MMOL/L (ref 8–16)
BILIRUB SERPL-MCNC: 0.41 MG/DL (ref 0.2–1.3)
BUN SERPL-MCNC: 85 MG/DL (ref 7–18)
CALCIUM SERPL-MCNC: 7.8 MG/DL (ref 8.5–10.1)
CHLORIDE SERPL-SCNC: 106 MMOL/L (ref 98–107)
CO2 SERPL-SCNC: 18.9 MMOL/L (ref 21–32)
CREAT SERPL-MCNC: 11.6 MG/DL (ref 0.6–1.3)
GLOBULIN SER-MCNC: 3.9 G/L
GLUCOSE SERPL-MCNC: 87 MG/DL (ref 74–106)
OSMOLALITY SERPL CALC.SUM OF ELEC: 309 MOSM/KG (ref 275–300)
POTASSIUM SERPL-SCNC: 5 MMOL/L (ref 3.5–5.1)
PROT SERPL-MCNC: 7.1 G/DL (ref 6.4–8.2)
SODIUM SERPL-SCNC: 143 MMOL/L (ref 136–145)

## 2018-12-18 PROCEDURE — 0JHM3XZ INSERTION OF TUNNELED VASCULAR ACCESS DEVICE INTO LEFT UPPER LEG SUBCUTANEOUS TISSUE AND FASCIA, PERCUTANEOUS APPROACH: ICD-10-PCS | Performed by: SURGERY

## 2018-12-18 PROCEDURE — 06HN33Z INSERTION OF INFUSION DEVICE INTO LEFT FEMORAL VEIN, PERCUTANEOUS APPROACH: ICD-10-PCS | Performed by: SURGERY

## 2018-12-18 PROCEDURE — B5191ZZ FLUOROSCOPY OF INFERIOR VENA CAVA USING LOW OSMOLAR CONTRAST: ICD-10-PCS | Performed by: SURGERY

## 2018-12-18 PROCEDURE — B54CZZA ULTRASONOGRAPHY OF LEFT LOWER EXTREMITY VEINS, GUIDANCE: ICD-10-PCS | Performed by: SURGERY

## 2018-12-19 VITALS — DIASTOLIC BLOOD PRESSURE: 71 MMHG | SYSTOLIC BLOOD PRESSURE: 124 MMHG

## 2018-12-19 VITALS — SYSTOLIC BLOOD PRESSURE: 121 MMHG | DIASTOLIC BLOOD PRESSURE: 73 MMHG

## 2018-12-19 VITALS — SYSTOLIC BLOOD PRESSURE: 138 MMHG | DIASTOLIC BLOOD PRESSURE: 78 MMHG

## 2018-12-19 VITALS — DIASTOLIC BLOOD PRESSURE: 60 MMHG | SYSTOLIC BLOOD PRESSURE: 118 MMHG

## 2018-12-20 VITALS — DIASTOLIC BLOOD PRESSURE: 72 MMHG | SYSTOLIC BLOOD PRESSURE: 142 MMHG

## 2018-12-20 VITALS — DIASTOLIC BLOOD PRESSURE: 70 MMHG | SYSTOLIC BLOOD PRESSURE: 132 MMHG

## 2018-12-20 VITALS — DIASTOLIC BLOOD PRESSURE: 73 MMHG | SYSTOLIC BLOOD PRESSURE: 147 MMHG

## 2018-12-20 VITALS — SYSTOLIC BLOOD PRESSURE: 115 MMHG | DIASTOLIC BLOOD PRESSURE: 73 MMHG

## 2018-12-20 VITALS — DIASTOLIC BLOOD PRESSURE: 71 MMHG | SYSTOLIC BLOOD PRESSURE: 139 MMHG

## 2018-12-20 VITALS — SYSTOLIC BLOOD PRESSURE: 121 MMHG | DIASTOLIC BLOOD PRESSURE: 55 MMHG

## 2018-12-21 VITALS — SYSTOLIC BLOOD PRESSURE: 101 MMHG | DIASTOLIC BLOOD PRESSURE: 51 MMHG

## 2018-12-21 VITALS — SYSTOLIC BLOOD PRESSURE: 133 MMHG | DIASTOLIC BLOOD PRESSURE: 69 MMHG

## 2018-12-21 VITALS — DIASTOLIC BLOOD PRESSURE: 64 MMHG | SYSTOLIC BLOOD PRESSURE: 112 MMHG

## 2018-12-21 VITALS — DIASTOLIC BLOOD PRESSURE: 72 MMHG | SYSTOLIC BLOOD PRESSURE: 124 MMHG

## 2018-12-21 PROCEDURE — 03C83ZZ EXTIRPATION OF MATTER FROM LEFT BRACHIAL ARTERY, PERCUTANEOUS APPROACH: ICD-10-PCS | Performed by: SURGERY

## 2018-12-21 PROCEDURE — 03783DZ DILATION OF LEFT BRACHIAL ARTERY WITH INTRALUMINAL DEVICE, PERCUTANEOUS APPROACH: ICD-10-PCS | Performed by: SURGERY

## 2018-12-22 VITALS — SYSTOLIC BLOOD PRESSURE: 109 MMHG | DIASTOLIC BLOOD PRESSURE: 60 MMHG

## 2018-12-22 VITALS — DIASTOLIC BLOOD PRESSURE: 54 MMHG | SYSTOLIC BLOOD PRESSURE: 106 MMHG

## 2018-12-22 VITALS — SYSTOLIC BLOOD PRESSURE: 117 MMHG | DIASTOLIC BLOOD PRESSURE: 57 MMHG

## 2019-01-18 ENCOUNTER — HOSPITAL ENCOUNTER (OUTPATIENT)
Dept: HOSPITAL 84 - D.OPS | Age: 84
LOS: 1 days | Discharge: HOME | End: 2019-01-19
Payer: MEDICARE

## 2019-01-18 VITALS — SYSTOLIC BLOOD PRESSURE: 143 MMHG | DIASTOLIC BLOOD PRESSURE: 67 MMHG

## 2019-01-18 VITALS — SYSTOLIC BLOOD PRESSURE: 161 MMHG | DIASTOLIC BLOOD PRESSURE: 96 MMHG

## 2019-01-18 VITALS — BODY MASS INDEX: 35.57 KG/M2 | WEIGHT: 188.4 LBS | BODY MASS INDEX: 35.57 KG/M2 | HEIGHT: 61 IN

## 2019-01-18 DIAGNOSIS — T82.868A: Primary | ICD-10-CM

## 2019-01-18 DIAGNOSIS — N18.6: ICD-10-CM

## 2019-01-18 DIAGNOSIS — Z99.2: ICD-10-CM

## 2019-01-18 LAB
ANION GAP SERPL CALC-SCNC: 18 MMOL/L (ref 8–16)
APTT BLD: 30.3 SECONDS (ref 22.8–39.4)
BASOPHILS NFR BLD AUTO: 0.3 % (ref 0–2)
BUN SERPL-MCNC: 27 MG/DL (ref 7–18)
CALCIUM SERPL-MCNC: 8.9 MG/DL (ref 8.5–10.1)
CHLORIDE SERPL-SCNC: 104 MMOL/L (ref 98–107)
CO2 SERPL-SCNC: 21.2 MMOL/L (ref 21–32)
CREAT SERPL-MCNC: 5.1 MG/DL (ref 0.6–1.3)
EOSINOPHIL NFR BLD: 4.3 % (ref 0–7)
ERYTHROCYTE [DISTWIDTH] IN BLOOD BY AUTOMATED COUNT: 20.1 % (ref 11.5–14.5)
GLUCOSE SERPL-MCNC: 92 MG/DL (ref 74–106)
HCT VFR BLD CALC: 31.4 % (ref 36–48)
HGB BLD-MCNC: 10.3 G/DL (ref 12–16)
IMM GRANULOCYTES NFR BLD: 0.8 % (ref 0–5)
INR PPP: 1.02 (ref 0.85–1.17)
LYMPHOCYTES NFR BLD AUTO: 20.9 % (ref 15–50)
MCH RBC QN AUTO: 31.7 PG (ref 26–34)
MCHC RBC AUTO-ENTMCNC: 32.8 G/DL (ref 31–37)
MCV RBC: 96.6 FL (ref 80–100)
MONOCYTES NFR BLD: 6.5 % (ref 2–11)
NEUTROPHILS NFR BLD AUTO: 67.2 % (ref 40–80)
OSMOLALITY SERPL CALC.SUM OF ELEC: 282 MOSM/KG (ref 275–300)
PLATELET # BLD: 173 10X3/UL (ref 130–400)
PMV BLD AUTO: 9.1 FL (ref 7.4–10.4)
POTASSIUM SERPL-SCNC: 4.2 MMOL/L (ref 3.5–5.1)
PROTHROMBIN TIME: 12.9 SECONDS (ref 11.6–15)
RBC # BLD AUTO: 3.25 10X6/UL (ref 4–5.4)
SODIUM SERPL-SCNC: 139 MMOL/L (ref 136–145)
WBC # BLD AUTO: 10.1 10X3/UL (ref 4.8–10.8)

## 2019-01-18 NOTE — NUR
RESUMING PATIENT CARE. PATIENT REMAINS SEDATED BUT IS EASILY AROUSABLE.
RESPIRATIONS ARE EVEN AND UNLABORED. NO S/S OF DISTRESS. NO C/O PAIN. CALL
LIGHT WITHIN REACH. NO NEEDS AT THIS TIME. PATIENT BOARD UPDATED. WILL CPOC.

## 2019-01-18 NOTE — NUR
RECIEVED FROM RECOVERY ROOM. SEDATED BUT AWAKENS EASILY. DRSG TIMES 2 ON LEFT
UPPER ARM, GOOD BRUIT AND THRILL. HEMISPLIT TO LEFT GROIN. V/S STABLE. SR UP
WITH CALL LIGHT IN REACH. FAMILY AT BEDSIDE. . DENIES ANY NEEDS AT PRESENT
TIME.

## 2019-01-18 NOTE — NUR
CALLED TO PATIENT ROOM. PATIENT TAKING OWN SUPPLY OF MEDICATION. PATIENT
STATED SHE TOOK HER HYDRALAZINE 50 MG AND COREG 3.125 MG. EXPLAINED TO PATIENT
NOT TO TAKE ANY MEDICATION UNLESS OK'D BY PHYSICIAN. SO THAT WE ARE AWARE OF
WHAT SHE IS TAKEN. PATIENT VERBALIZED UNDERSTANDING. WILL CPOC.

## 2019-01-19 VITALS — DIASTOLIC BLOOD PRESSURE: 72 MMHG | SYSTOLIC BLOOD PRESSURE: 149 MMHG

## 2019-01-19 VITALS — SYSTOLIC BLOOD PRESSURE: 137 MMHG | DIASTOLIC BLOOD PRESSURE: 69 MMHG

## 2019-01-19 VITALS — DIASTOLIC BLOOD PRESSURE: 59 MMHG | SYSTOLIC BLOOD PRESSURE: 121 MMHG

## 2019-01-19 NOTE — NUR
SPOKE WITH DR. HENDERSON NOTIFIED HIM OF PATIENT ARRIVAL. REORDERED HS MEDS. WILL
SPEAK WITH HOUSE SUPERVISER REGARDING PUTTING PATIENT ON PHYSICIANS ROUNDING
LIST. WILL CPOC.

## 2019-01-19 NOTE — NUR
RECEIVED REPORT. ASSUMED CARE OF PATIENT. CALL LIGHT WITHIN REACH. NO
DISTRESS. PATIENT IS HARD OF HEARING. LEFT ARM WITH GOOD BRUIT AND THRILL.
PATIENT SCHEDULED TO RECEIVE DIALYSIS TODAY. NO DISTRESS.

## 2019-01-19 NOTE — NUR
UNABLE TO GET PATIENT WEIGHT. PATIENT STATED SHE DOES NOT STAND. THE BED IN
ROOM DOES NOT HAVE SCALE.

## 2019-01-19 NOTE — NUR
TELEMETRY REMOVED. 22 GAUGE IV REMOVED FROM RIGHT HAND. CATHETER TIP INTACT.
NO BLEEDING FROM SITE. 2X2 GAUZE APPLIED AND SECURED WITH TAPE. TOLERATED IV
REMOVAL WELL. PATIENT DISCHARGING TO HOME SOON.

## 2019-01-19 NOTE — NUR
PATIENT LEFT UNIT VIA WHEELCHAIR. PATIENT DISCHARGED TO HOME. PATIENT LEFT
UNIT WITH ALL PERSONAL BELONGINGS. PATIENT DISHCARGED TOHOME WITH FAMILY.
PATIENT HAD HARD SCRIPT FOR [employer] UPON LEAVING UNIT.

## 2019-01-20 NOTE — OP
PATIENT NAME:  JIAN BERMUDEZ                            MEDICAL RECORD: [id number]
:35                                             LOCATION:D.OPS          
                                                         ADMISSION DATE:        
SURGEON:  FAUSTINA WRIGHT MD            
 
 
DATE OF OPERATION:  2019
 
REFERRING PHYSICIAN:  Dr. Hughes of [place].
 
PREOPERATIVE DIAGNOSES:  End-stage renal disease with dependence on
hemodialysis.
 
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
 
OPERATION PERFORMED:  Percutaneous fistulogram with AngioJet mechanical
thrombolysis and balloon angioplasty of the brachial artery segment in the mid
arm, remote from the arterial anastomosis followed by open revision without
thrombectomy by implantation of an Artegraft bovine prosthetic between HeRO
graft just above the antecubital space and the most proximal brachial artery.
 
SURGEON:  Faustina Wright MD
 
ANESTHESIA:  General with LMA per CRNA
 
REFERRING PHYSICIAN:  Venus Hughes MD
 
NEPHROLOGIST:  On-call here today is Mark Henderson MD
 
PREOPERATIVE NOTE:  Ms. Bermudez is an 83-year-old -American female on
chronic hemodialysis in [place], [place].  She has HeRO graft on the left,
which she has had now for some time, but in the last couple of months, she has
had repeated recurring graft thromboses.  I worked on her twice just fairly
recently and the last time found a long segment of brachial artery stenosis in
the arm well above the arterial anastomosis and I treated that with balloon
angioplasty, and implantation of a Viabahn stent in the brachial artery.  She,
however, thrombosed not long after that operation.  Fortunately, she was able to
have a HemoSplit catheter inserted in the femoral vein in [place] and she is
brought back now semi-electively for a thrombectomy and re-work of her graft. 
This time, I plan to go ahead and move the arterial inflow or arterial
anastomosis well up on to the most proximal brachial artery or axillary artery.
 
PROCEDURE:  With the patient under anesthesia in supine position, she was
prepped and draped in sterile manner.  I made 2 incisions, one on the upper
medial aspect of the arm and exposed the brachial artery and another to expose
the graft just above the antecubital space.  The graft was then accessed with a
micropuncture technique and a 6-French introducer placed and a guidewire
inserted and the AngioJet catheter used to lyse and remove clot from the body of
the graft and the HeRO outflow device.  Contrast injection revealed no residual
problems or stenoses.  I then passed a wire and catheter distally through the
arterial anastomosis and up in the proximal brachial artery and performed a
selective subclavian artery arteriogram.  This revealed numerous collaterals in
the upper arm and a long segment of recurrent severe stenosis of the brachial
artery with patency, however, of these stented segment and very slow flow
through the distal brachial artery into the forearm, which I believe is probably
mostly supplied by collaterals.  I performed balloon angioplasty with a 6 mm
diameter balloon of this segment of brachial artery and repeated contrast
 
 
 
OPERATIVE REPORT                               [id number]    JIAN BERMUDEZ          
 
 
injections revealed significant improvement with still good flow through that
segment of the artery.  The patient was heparinized with 3000 units of heparin
systemically and HeRO the graft was ligated just below or distal to the cephalic
vein to Acuseal graft anastomosis.  This HeRO having been implanted utilizing
the previous brachiocephalic fistula remnants.  The HeRO graft was transected. 
It was flushed vigorously with heparinized saline and clamped and I chose a 6-mm
diameter Artegraft and anastomosed it end-to-end to the Acuseal segment with
running 6-0 Prolene.  The suture line was treated with BioGlue and the graft
flushed with heparinized saline and clamped.  The graft was then placed in a
subcutaneous tunnel and taken upwards to the most proximal brachial artery where
the graft was shortened and bevelled.  The artery was opened and flushed
proximally and distally with heparinized saline and then occluded with Silastic
loops.  The new graft was anastomosed end of graft to side of artery with
continuous running 6-0 Prolene and when completed, the occluding clamps and
loops were released, excellent flow was developed immediately within the HeRO
graft, and in the brachial artery and the suture line was hemostatic.  I
demonstrated good Doppler flow in the radial and ulnar arteries with and without
graft occlusion, indicating as I have said probable significant supply by
collaterals in the upper arm bypassing the HeRO graft.  The patient's heparin
was reversed with 10 mg of protamine and she was given 20 mcg of DDAVP. 
Hemostasis additionally was obtained with electrocautery and gentle compression
with dry gauze.  The wounds were closed without the use of a drain approximating
subcutaneous tissues with interrupted inverted 3-0 Vicryl.  The skin was closed
with running intracuticular 4-0 Monocryl and Dermabond glue and dressed with
Maxorb Ag, Tegaderm, and Cavilon skin prep.  At that point, the patient was
awakened and taken to the recovery room in stable condition with a good bruit
audible over her HeRO graft.
 
It is 5:00 in the afternoon.  The patient had a long operation.  She is 83 years
old, lives a long distance from here and I think it is very appropriate that she
be kept in overnight observation and have dialysis here tomorrow via her HeRO. 
She should be able hopefully to be discharged to home following that.  She will
continue all of her same medications and an appointment will be arranged for her
to return to see me in my office probably week after next.  Once she has been
dialyzing HeRO graft for several weeks she can be scheduled for HemoSplit or
tunneled dialysis catheter removal either in [place] or at [facility].
 
Blood loss during the operation was perhaps 100 cc or less.  None was replaced. 
Sponges, instruments, and needles were accounted for.  No drain was used.  No
surgical specimen was submitted for histopathology.
 
TRANSINT:[id number] Voice Confirmation ID: [id number] DOCUMENT ID: [id number]
CC:  CORTNEY Pisano [phone]
                                           
                                           FAUSTINA WRIGHT MD            
 
 
 
Electronically Signed by FAUSTINA WRIGHT on 19 at 1909
CC: CORTNEY DUQUE, MARK HENDERSON and VENUS HUGHES III [phone]
DICTATION DATE: 19 1650     :     19 0003      [facility] 
                                                                      19
[facility]                                          
[address] [place] [address]

## 2019-03-22 ENCOUNTER — HOSPITAL ENCOUNTER (INPATIENT)
Dept: HOSPITAL 84 - D.OPS | Age: 84
LOS: 2 days | Discharge: HOME | DRG: 264 | End: 2019-03-24
Attending: SURGERY | Admitting: SURGERY
Payer: MEDICARE

## 2019-03-22 VITALS — DIASTOLIC BLOOD PRESSURE: 62 MMHG | SYSTOLIC BLOOD PRESSURE: 128 MMHG

## 2019-03-22 VITALS
BODY MASS INDEX: 35.5 KG/M2 | HEIGHT: 61 IN | BODY MASS INDEX: 35.5 KG/M2 | BODY MASS INDEX: 35.5 KG/M2 | WEIGHT: 188 LBS | BODY MASS INDEX: 35.5 KG/M2

## 2019-03-22 VITALS — DIASTOLIC BLOOD PRESSURE: 60 MMHG | SYSTOLIC BLOOD PRESSURE: 127 MMHG

## 2019-03-22 VITALS — DIASTOLIC BLOOD PRESSURE: 57 MMHG | SYSTOLIC BLOOD PRESSURE: 147 MMHG

## 2019-03-22 DIAGNOSIS — I12.0: ICD-10-CM
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
## 2019-03-22 DIAGNOSIS — F41.9: ICD-10-CM

## 2019-03-22 DIAGNOSIS — N18.6: ICD-10-CM

## 2019-03-22 DIAGNOSIS — E11.22: ICD-10-CM

## 2019-03-22 DIAGNOSIS — Z99.2: ICD-10-CM

## 2019-03-22 DIAGNOSIS — T82.868A: Primary | ICD-10-CM

## 2019-03-22 LAB
ANION GAP SERPL CALC-SCNC: 18 MMOL/L (ref 8–16)
BASOPHILS NFR BLD AUTO: 0.4 % (ref 0–2)
BUN SERPL-MCNC: 30 MG/DL (ref 7–18)
CALCIUM SERPL-MCNC: 8.8 MG/DL (ref 8.5–10.1)
CHLORIDE SERPL-SCNC: 103 MMOL/L (ref 98–107)
CO2 SERPL-SCNC: 22 MMOL/L (ref 21–32)
CREAT SERPL-MCNC: 5.7 MG/DL (ref 0.6–1.3)
EOSINOPHIL NFR BLD: 8.2 % (ref 0–7)
ERYTHROCYTE [DISTWIDTH] IN BLOOD BY AUTOMATED COUNT: 19.4 % (ref 11.5–14.5)
GLUCOSE SERPL-MCNC: 84 MG/DL (ref 74–106)
HCT VFR BLD CALC: 34.5 % (ref 36–48)
HGB BLD-MCNC: 10.9 G/DL (ref 12–16)
IMM GRANULOCYTES NFR BLD: 0.4 % (ref 0–5)
INR PPP: 1.05 (ref 0.85–1.17)
LYMPHOCYTES NFR BLD AUTO: 31.6 % (ref 15–50)
MCH RBC QN AUTO: 30.2 PG (ref 26–34)
MCHC RBC AUTO-ENTMCNC: 31.6 G/DL (ref 31–37)
MCV RBC: 95.6 FL (ref 80–100)
MONOCYTES NFR BLD: 9.2 % (ref 2–11)
NEUTROPHILS NFR BLD AUTO: 50.2 % (ref 40–80)
OSMOLALITY SERPL CALC.SUM OF ELEC: 282 MOSM/KG (ref 275–300)
PLATELET # BLD: 198 10X3/UL (ref 130–400)
PMV BLD AUTO: 9.6 FL (ref 7.4–10.4)
POTASSIUM SERPL-SCNC: 4 MMOL/L (ref 3.5–5.1)
PROTHROMBIN TIME: 13.2 SECONDS (ref 11.6–15)
RBC # BLD AUTO: 3.61 10X6/UL (ref 4–5.4)
SODIUM SERPL-SCNC: 139 MMOL/L (ref 136–145)
WBC # BLD AUTO: 7.3 10X3/UL (ref 4.8–10.8)

## 2019-03-22 PROCEDURE — 03180JV BYPASS LEFT BRACHIAL ARTERY TO SUPERIOR VENA CAVA WITH SYNTHETIC SUBSTITUTE, OPEN APPROACH: ICD-10-PCS | Performed by: SURGERY

## 2019-03-22 NOTE — NUR
INITIAL ROUNDS COMPLETED AT 1915 HRS.  PT AWAKES TO VERBAL STIMULI.  APPLE
JUICE GIVEN PER REQUEST.  ASSESSMENT COMPLETED AT 1950 HRS.  VSS.  PT AWKES TO
VERBAL STIMULI.  DENIES ANY N/V AFTER DRINKING APPLE JUICE.  IV TO R HAND WITH
VANCOMYCIN INFUSING.  IV PATENT.  UPPER L ARM HEROGRAFT WITH GOOD BRUIT AND
THRILL.  NO BLEEDING NOTED.  ZAMAN.  LUNGS CTA.  PT VERY [place].  SANDWICH TRAY
GIVEN.  SR UYP X2,CALL LIGHT WITHIN REACH.

## 2019-03-22 NOTE — NUR
PT INCONTINENT OF URINE.  INCONTINENT CARE DONE. BED LINENS CHANGED.  PT HAS
C/O INCISION PAIN. RENAL SERVICES CALLED.  NEW ORDERS RECEIVED AND NOTED.
NORCO 10/325 PO GIVEN FOR C/O INCISIONAL PAIN.  SR UP X2, CALL LIGHT WITHIN
REACH.

## 2019-03-22 NOTE — NUR
1730-RECEVIED VIA STRETCHER FROM THE RECOVERY ROOM. VERY [place], X 4 ASSIST
TO BED. RESEVRE LEFT ARM WITH NEW HEMOGRAFT REVISION. + BRUIT AND THRILL.
FAMILY AT BEDSIDE. WILL ADMIT.

## 2019-03-23 VITALS — SYSTOLIC BLOOD PRESSURE: 127 MMHG | DIASTOLIC BLOOD PRESSURE: 45 MMHG

## 2019-03-23 VITALS — DIASTOLIC BLOOD PRESSURE: 70 MMHG | SYSTOLIC BLOOD PRESSURE: 116 MMHG

## 2019-03-23 VITALS — SYSTOLIC BLOOD PRESSURE: 136 MMHG | DIASTOLIC BLOOD PRESSURE: 68 MMHG

## 2019-03-23 VITALS — DIASTOLIC BLOOD PRESSURE: 69 MMHG | SYSTOLIC BLOOD PRESSURE: 128 MMHG

## 2019-03-23 VITALS — SYSTOLIC BLOOD PRESSURE: 132 MMHG | DIASTOLIC BLOOD PRESSURE: 74 MMHG

## 2019-03-23 PROCEDURE — 5A1D70Z PERFORMANCE OF URINARY FILTRATION, INTERMITTENT, LESS THAN 6 HOURS PER DAY: ICD-10-PCS | Performed by: INTERNAL MEDICINE

## 2019-03-23 NOTE — NUR
VSS THROUGHOUT NIGHT.  NO BLEEDING OR SWELLING NOTED TO L HEROGRAFT REVISION.
PT CLEAN AND DRY AT THIS TIME.  NEEDS MET; CALL LIGHT WITHIN REACH.

## 2019-03-23 NOTE — NUR
INITIAL ROUNDS COMPLETED AT 1915 HRS.  PT DENIED ANY DISCOMFORT.  FAMILY AT
BEDSIDE.  PT STATES HOB KEEPS GOING UP EVEN THOUGH SHE IS NOT TOUCHING THE
CONTROLS. BEDS CHANGED OUT.  PT INCONTINENT OF A SMALL AMOUNT OF URINE.
INCONTINENT CARE DONE.  ASSESSMENT COMPLETED AT 2010 HRS.  VSS.  IV TO R HAND
WITH VANCOMYCIN INFUSING.  IV PATENT.  L UPPER ARM TESSIO WITH BRUIT AND HRILL
NOTED.  DRESSING CLEAN, DRY AND INTACT. LUNGS ESSENTIALLY CTA.  ZAMAN. PT VERY
[place].  SR UP X2, CALL LIGHT WITHIN REACH AND BED ALARM ON.

## 2019-03-23 NOTE — NUR
BED BATH DONE.  PT STASTES FELS MUCH IMPROVED.  PT REFUSES SCD'S.  SR UP
X2,CALL LIGHT WITHIN REACH AND BED ALARM ON.

## 2019-03-24 VITALS — SYSTOLIC BLOOD PRESSURE: 128 MMHG | DIASTOLIC BLOOD PRESSURE: 51 MMHG

## 2019-03-24 VITALS — DIASTOLIC BLOOD PRESSURE: 50 MMHG | SYSTOLIC BLOOD PRESSURE: 122 MMHG

## 2019-03-24 VITALS — DIASTOLIC BLOOD PRESSURE: 48 MMHG | SYSTOLIC BLOOD PRESSURE: 126 MMHG

## 2019-03-24 LAB
ANION GAP SERPL CALC-SCNC: 15 MMOL/L (ref 8–16)
BASOPHILS NFR BLD AUTO: 0 % (ref 0–2)
BUN SERPL-MCNC: 30 MG/DL (ref 7–18)
CALCIUM SERPL-MCNC: 7.7 MG/DL (ref 8.5–10.1)
CHLORIDE SERPL-SCNC: 103 MMOL/L (ref 98–107)
CO2 SERPL-SCNC: 24 MMOL/L (ref 21–32)
CREAT SERPL-MCNC: 6 MG/DL (ref 0.6–1.3)
EOSINOPHIL NFR BLD: 6.3 % (ref 0–7)
ERYTHROCYTE [DISTWIDTH] IN BLOOD BY AUTOMATED COUNT: 20.7 % (ref 11.5–14.5)
GLUCOSE SERPL-MCNC: 84 MG/DL (ref 74–106)
HCT VFR BLD CALC: 26.1 % (ref 36–48)
HGB BLD-MCNC: 9.1 G/DL (ref 12–16)
IMM GRANULOCYTES NFR BLD: 0.4 % (ref 0–5)
LYMPHOCYTES NFR BLD AUTO: 23 % (ref 15–50)
MCH RBC QN AUTO: 34.2 PG (ref 26–34)
MCHC RBC AUTO-ENTMCNC: 34.9 G/DL (ref 31–37)
MCV RBC: 98.1 FL (ref 80–100)
MONOCYTES NFR BLD: 10.7 % (ref 2–11)
NEUTROPHILS NFR BLD AUTO: 59.6 % (ref 40–80)
OSMOLALITY SERPL CALC.SUM OF ELEC: 280 MOSM/KG (ref 275–300)
PLATELET # BLD: 217 10X3/UL (ref 130–400)
PMV BLD AUTO: 9.6 FL (ref 7.4–10.4)
POTASSIUM SERPL-SCNC: 4 MMOL/L (ref 3.5–5.1)
RBC # BLD AUTO: 2.66 10X6/UL (ref 4–5.4)
SODIUM SERPL-SCNC: 138 MMOL/L (ref 136–145)
VANCOMYCIN SERPL-MCNC: 19.3 UG/ML (ref 10–20)
WBC # BLD AUTO: 5.7 10X3/UL (ref 4.8–10.8)

## 2019-03-24 NOTE — NUR
PT ESCORTED OUT VIA WHEELCHAIR. ASSISTED BY PT AND MYSELF INTO THE TRUCK.
FAMILY SAID I WAS FANTASTIC.

## 2019-03-24 NOTE — NUR
PT RESTING WITH EYES CLOSED.  RESP EVEN AND REGULAR.  SR UP X2,CALL LIGHT
WITHIN REACH AND BED ALARM ON.

## 2019-03-24 NOTE — MORECARE
CASE MANAGEMENT DISCHARGE SUMMARY
 
 
PATIENT: JIAN BERMUDEZ                      UNIT: [id number]
ACCOUNT#: [id number]                       ADM DATE: 19
AGE: 83     : 35  SEX: F            ROOM/BED: D.6699    
AUTHOR: JOSE ELIAS SALAS                             PHYSICIAN:                               
 
REFERRING PHYSICIAN: FAUSTINA WRIGHT MD            
DATE OF SERVICE: 19
Discharge Plan
 
 
Patient Name: JAIN BERMUDEZ
Facility: [facility]:[place]
Encounter #: [id number]
Medical Record #: [id number]
: 1935
Planned Disposition: Home
Anticipated Discharge Date: 3/24/19
 
Discharge Date: 2019
Expected LOS: 2
Initial Reviewer: [email or web address]
Initial Review Date: 2019
Generated: 3/24/19   9:17 pm 
 DCPIA - Discharge Planning Initial Assessment
 
Updated by [email or web address]: Cecilia Harley on 3/24/19   8:12 pm
*  Is the patient Alert and Oriented?
Yes
*  How many steps to enter\exit or inside your home? none *  PCP DR REAL CERVANTES * 
Pharmacy
[facility] [place], [place]
*  Preadmission Environment
Home with Family
*  ADLs
Partial Dependent
*  Partial ADLs (Assistance needed)
Bathing
Dressing
*  Equipment
Walker
Wheelchair
*  Other Equipment
DENIES ANT ADDITIONAL DME
*  List name and contact numbers for known caregivers / representatives who 
currently or will assist patient after discharge:
[facility]- [phone]
*  Verbal permission to speak to the caregivers and representatives has been 
 
obtained from the patient.
Yes
*  Community resources currently utilized
Other
*  Please name any agencies selected above.
[facility]  T/T/S
*  Additional services required to return to the preadmission environment?
No
*  Can the patient safely return to the preadmission environment?
Yes
*  Has this patient been hospitalized within the prior 30 days at any 
hospital?
Yes
 
 
 
 
 
 
 
Last DP export: 3/24/19   7:10 p
Patient Name: JIAN BERMUDEZ
Encounter #: [id number]
Page [id number]
 
 
 
 
 
Electronically Signed by JOSE ELIAS SALAS on 19 at 2017
 
 
 
 
 
 
**All edits/amendments must be made on the electronic document**
 
DICTATION DATE: 19     : ARY  19 2016     
RPT#: [id number]                                DC DATE:19
                                               STATUS: DIS IN  
[facility]
 [place] [address]
***END OF REPORT***

## 2019-03-24 NOTE — MORECARE
CASE MANAGEMENT DISCHARGE SUMMARY
 
 
PATIENT: JIAN BERMUDEZ                      UNIT: [id number]
ACCOUNT#: [id number]                       ADM DATE: 19
AGE: 83     : 35  SEX: F            ROOM/BED: D.2414    
AUTHOR: JOSUEDOC                             PHYSICIAN:                               
 
REFERRING PHYSICIAN: FAUSTINA WRIGHT MD            
DATE OF SERVICE: 19
Discharge Plan
 
 
Patient Name: JIAN BERMUDEZ
Facility: [facility]:[place]
Encounter #: [id number]
Medical Record #: [id number]
: 1935
Planned Disposition: Home
Anticipated Discharge Date: 3/24/19
 
Discharge Date: 2019
Expected LOS: 2
Initial Reviewer: [email or web address]
Initial Review Date: 2019
Generated: 3/24/19   9:23 pm 
Comments
 
DCP- Discharge Planning
 
Updated by [id number]: Cecilia Harley on 3/24/19   7:22 pm CT
LATE ENTRY 1100  
PATIENT RESTING IN BED. IS ANXIOUS FOR DISCHARGE. AWAITING STAFF TO ASSIST W/ 
DRESSING AND D/C HER IV.  
HER DAUGHTER, DYLAN , AND SON IN LAW ARE AT THE BEDSIDE.  
PATIENT GIVES PERMISSION FOR CM TO DISCUSS DISCHARGE NEEDS W/ DTR AND SON IN 
LAW PRESENT. PATIENT IS LITTLE HARD OF HEARING.  
CM EXPLAINS ROLE.   
PATIENT DENIES ANY NEEDS. SHE LIVES W/ HER SPOUSE. HER DAUGHTER, WHITNEY COFFMAN , ASSIST W/ HER CARE. MS COFFMAN'S CONTACT PHONE NUMBER IS 
[phone].  
PATIENT RECEIVES HD T/T/S EARLY SHIFT 0600.  
HAS TRANSPORTATION TO HOME.  
NO COMMUNITY OR HOME HEALTH SERVICES  
DME- WALKER AND WHEELCHAIR  
DENIES ANY NEEDS AT THIS TIME.
 DCPIA - Discharge Planning Initial Assessment
 
Updated by [email or web address]: Cecilia Harley on 3/24/19   8:12 pm
*  Is the patient Alert and Oriented?
 
Yes
*  How many steps to enter\exit or inside your home? none *  PCP DR REAL CERVANTES * 
Pharmacy
[facility] [place]
*  Preadmission Environment
Home with Family
*  ADLs
Partial Dependent
*  Partial ADLs (Assistance needed)
Bathing
Dressing
*  Equipment
Walker
Wheelchair
*  Other Equipment
DENIES ANT ADDITIONAL DME
*  List name and contact numbers for known caregivers / representatives who 
currently or will assist patient after discharge:
WHITNEY COFFMAN- DTR- [phone]
*  Verbal permission to speak to the caregivers and representatives has been 
obtained from the patient.
Yes
*  Community resources currently utilized
Other
*  Please name any agencies selected above.
[facility]  T/T/S
*  Additional services required to return to the preadmission environment?
No
*  Can the patient safely return to the preadmission environment?
Yes
*  Has this patient been hospitalized within the prior 30 days at any 
hospital?
Yes
 
 
 
 
 
 
 
Last DP export: 3/24/19   7:17 p
Patient Name: JIAN BERMUDEZ
 
Encounter #: [id number]
Page [id number]
 
 
 
 
 
Electronically Signed by JOSE ELIAS SALAS on 19 at 
 
 
 
 
 
 
**All edits/amendments must be made on the electronic document**
 
DICTATION DATE: 19     : ARY  19     
RPT#: [id number]                                DC DATE:19
                                               STATUS: DIS IN  
[facility]
[address] [place] [address]
***END OF REPORT***

## 2019-03-24 NOTE — NUR
PT EASY TO [facility], STATES NOT CONCERNS/COMPLAINTS THIS A.M. NO FAMILY AT
BEDSIDE CURRENTLY. CL IN REACH. SRX2.

## 2019-03-24 NOTE — MORECARE
CASE MANAGEMENT DISCHARGE SUMMARY
 
 
PATIENT: JIAN BERMUDEZ                      UNIT: [id number]
ACCOUNT#: [id number]                       ADM DATE: 19
AGE: 83     : 35  SEX: F            ROOM/BED: D.2335    
AUTHOR: JOSE ELIAS SALAS                             PHYSICIAN:                               
 
REFERRING PHYSICIAN: FAUSTINA WRIGHT MD            
DATE OF SERVICE: 19
Discharge Plan
 
 
Patient Name: JIAN BERMUDEZ
Facility: [facility]:[place]
Encounter #: [id number]
Medical Record #: [id number]
: 1935
Planned Disposition: Home
Anticipated Discharge Date: 3/24/19
 
Discharge Date: 2019
Expected LOS: 2
Initial Reviewer: [email or web address]
Initial Review Date: 2019
Generated: 3/24/19   9:10 pm 
  
 
 
 
 
 
 
 
Patient Name: JIAN BERMUDEZ
 
Encounter #: [id number]
Page [id number]
 
 
 
 
 
Electronically Signed by JOSE ELIAS SALAS on 19 at 
 
 
 
 
 
 
**All edits/amendments must be made on the electronic document**
 
DICTATION DATE: 19     : ARY  19     
RPT#: [id number]                                DC DATE:19
                                               STATUS: DIS IN  
[facility]
[address] [place] [address]
***END OF REPORT***

## 2019-03-24 NOTE — NUR
VSS THROUGHOUT NIGHT.  PT RESTED WELL DURING SHIFT.  APPLE JUICE GIVEN PER
REQUEST.  NEEDS MET; WILL CONTINUE TO MONITOR.

## 2019-03-25 NOTE — OP
PATIENT NAME:  JIAN BERMUDEZ                            MEDICAL RECORD: [id number]
:35                                             LOCATION:D.M2     D.2129
                                                         ADMISSION DATE:19
SURGEON:  FAUSTINA WRIGHT MD            
 
 
DATE OF OPERATION:  2019
 
REFERRING PHYSICIANS:  Dr. Britton and Lyubov of [place] and Dr. Henderson of
[place]. 
 
PREOPERATIVE DIAGNOSES:  End-stage renal disease and dependence on hemodialysis,
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
 
POSTOPERATIVE DIAGNOSES:  End-stage renal disease and dependence on
hemodialysis, thrombophilia, and recurring thrombosis of left upper extremity
HeRO AV graft.
 
OPERATION PERFORMED:  Open revision without thrombectomy of HeRO graft, left
arm.
 
SURGEON:  Faustina Wright MD
 
ANESTHESIA:  General per CRNA.
 
PREOPERATIVE NOTE:  Ms. Bermudez is an 83-year-old -American female from
[place], [place].  She has end-stage renal disease and is dependent on renal
dialysis.  She has had numerous dialysis access failures and has been doing
pretty well, dialyzing with a left upper extremity HeRO graft, although she has
had to have several interventions.  Just last week, Dr. Henderson had to do another
mechanical thrombolysis procedure, and he and I decided that it was time to
proceed with a major revision of her graft.  The existing PTFE portion is
wearing out.  She was brought to the hospital today by her daughter.  It was
rather difficult for her to get transportation here and did not arrive until
pretty late in the day.
 
The patient was given general anesthesia per CRNA and placed in supine position,
prepped and draped in sterile manner.  I reopened the incision over the
deltopectoral groove and exposed the HeRO outflow component and the junction
with the PTFE graft.  I noted the PTFE graft was somewhat slimy consistent with
a biofilm possibly indicating infection.  The proximal PTFE and the connector
and superficial part of the venous outflow device were totally freed from the
surrounding tissues.  I made an incision then over the medial arm and exposed
the Artegraft segment, which is fairly close to the arterial anastomosis and the
Artegraft was dissected from the surrounding structures.  The patient was
systemically heparinized with 5000 units of heparin and after adequate
circulation time had elapsed, the Artegraft was transected and the venous
outflow device was clamped and transected and a portion of the HeRO outflow
graft with the connector and PTFE segment was excised and sent for culture.  A
portion of the Artegraft was also excised and sent for culture.  I chose a new
6-mm diameter Acuseal quick stick graft and anastomosed it end-to-end to the
Artegraft arterial limb.  This was done with running 6-0 Prolene and that
anastomosis was additionally sealed with BioGlue.  When tested the anastomosis
was watertight.  A counter incision was made on the medial aspect of the arm and
the new graft placed in a subcutaneous tunnel to that counter incision and then
placed in a very superficial subcutaneous tunnel going up to the deltopectoral
groove.  Up there, it was shortened and prepared for anastomosis with the
connector to the venous outflow.  The venous outflow segment was accessed and
 
 
 
OPERATIVE REPORT                               [id number]    JIAN BERMUDEZ          
 
 
aspirated to remove any clots, which might be present.  It was then flushed with
heparinized saline.  The new graft was allowed to bleed to expel any clots and
air from the system.  The connector was placed here in the outflow device end to
end and the Acuseal graft was then attached to the connector and the connector
closed and locked.  The clamps were removed and flow was established in the
HeRO.  A chest x-ray was then obtained in the operating room, which demonstrated
continued satisfactory position of the HeRO in the right atrium.  Two
interrupted simple sutures of 2-0 Prolene were used to anchor the venous outflow
device and connector to the pectoral fascia.  The wounds were then irrigated
with Ancef-gentamicin solution.  The patient was given a partial dose of 10 mg
of protamine to reverse heparin.  The wounds were closed with interrupted
inverted 3-0 Vicryl and running intracuticular 4-0 Monocryl.  The skin incisions
were sealed with glue and dressed with Maxorb Ag, Tegaderm, and Cavilon skin
prep.  The patient then awakened and was taken to the recovery room.  Prior to
closure, all of the incisions were infiltrated with 0.25% Marcaine with
epinephrine.  Also during the operation, culturettes were used to swab the slimy
graft, where I was suspicious of biofilm.  Note, there was no purulent matter
and the graft was seemingly secure within the tunnel, although not as well
seated as probably optimally.
 
Blood loss during the operation was about 10 cc, this was unreplaced.  Sponges,
instruments, and needles were accounted for.  No drain was used and no specimen
was submitted for histopathology.  All specimens were sent for culture.
 
The patient will need to be admitted to the hospital and stay here on
antibiotics until we have the culture report, and she will need to have dialysis
with the first stick of her Acuseal graft.  Ideally, this would be better on
 than Saturday.  She probably will be able to go home to [place] on
Monday depending on results of her cultures and how her graft is functioning of
course.
 
In the past, she has been on anticoagulants.  I believe she is presently on
Plavix or is supposed to be.  After her last or recent thrombectomy, she was
placed on Eliquis as well, although I do not believe she ever got the Eliquis
prescription filled.  I believe I would plan to send her home on Plavix and not
put her on Eliquis until she has another episode of graft thrombosis.
 
TRANSINT:[id number] Voice Confirmation ID: [id number] DOCUMENT ID: [id number]
cc: [facility]
                                           
                                           FAUSTINA WRIGHT MD            
 
 
 
Electronically Signed by FAUSTINA WRIGHT on 19 at 1727
 
 
CC: AVILA HENDERSON HINTON, JOHNNIE, III MD and AVILA BRITTON [id number]
DICTATION DATE: 19 171     :     19 2309      DIS IN  
                                                                      19
[address] [place] [address]

## 2019-03-26 NOTE — MORECARE
CASE MANAGEMENT DISCHARGE SUMMARY
 
 
PATIENT: JIAN BERMUDEZ                      UNIT: [id number]
ACCOUNT#: [id number]                       ADM DATE: 19
AGE: 83     : 35  SEX: F            ROOM/BED: D.5099    
AUTHOR: JSOUEDOC                             PHYSICIAN:                               
 
REFERRING PHYSICIAN: FAUSTINA WRIGHT MD            
DATE OF SERVICE: 19
Discharge Plan
 
 
Patient Name: JIAN BERMUDEZ
Facility: [facility]:[place]
Encounter #: [id number]
Medical Record #: [id number]
: 1935
Planned Disposition: Home
Anticipated Discharge Date: 3/24/19
 
Discharge Date: 2019
Expected LOS: 2
Initial Reviewer: [id number]
Initial Review Date: 2019
Generated: 3/26/19   9:04 am 
Comments
 
DCP- Discharge Planning
 
Updated by [id number]: Cecilia Harley on 3/24/19   7:22 pm CT
LATE ENTRY 1100  
PATIENT RESTING IN BED. IS ANXIOUS FOR DISCHARGE. AWAITING STAFF TO ASSIST W/ 
DRESSING AND D/C HER IV.  
HER DAUGHTER, DYLAN , AND SON IN LAW ARE AT THE BEDSIDE.  
PATIENT GIVES PERMISSION FOR CM TO DISCUSS DISCHARGE NEEDS W/ DTR AND SON IN 
LAW PRESENT. PATIENT IS LITTLE HARD OF HEARING.  
CM EXPLAINS ROLE.   
PATIENT DENIES ANY NEEDS. SHE LIVES W/ HER SPOUSE. HER DAUGHTER, WHITNEY COFFMAN , ASSIST W/ HER CARE. MS COFFMAN'S CONTACT PHONE NUMBER IS 
[phone].  
PATIENT RECEIVES HD T/T/S EARLY SHIFT 0600.  
HAS TRANSPORTATION TO HOME.  
NO COMMUNITY OR HOME HEALTH SERVICES  
DME- WALKER AND WHEELCHAIR  
DENIES ANY NEEDS AT THIS TIME.
 DCPIA - Discharge Planning Initial Assessment
 
Updated by [email or web address]: Cecilia Harley on 3/24/19   8:12 pm
*  Is the patient Alert and Oriented?
 
Yes
*  How many steps to enter\exit or inside your home? none *  PCP DR REAL CERVANTES * 
Pharmacy
[facility] [place]
*  Preadmission Environment
Home with Family
*  ADLs
Partial Dependent
*  Partial ADLs (Assistance needed)
Bathing
Dressing
*  Equipment
Walker
Wheelchair
*  Other Equipment
DENIES ANT ADDITIONAL DME
*  List name and contact numbers for known caregivers / representatives who 
currently or will assist patient after discharge:
WHITNEY COFFMAN- DTR- [phone]
*  Verbal permission to speak to the caregivers and representatives has been 
obtained from the patient.
Yes
*  Community resources currently utilized
Other
*  Please name any agencies selected above.
[facility]  T/T/S
*  Additional services required to return to the preadmission environment?
No
*  Can the patient safely return to the preadmission environment?
Yes
*  Has this patient been hospitalized within the prior 30 days at any 
hospital?
Yes
 
 
 
 
 
 
 
Last DP export: 3/24/19   7:23 p
Patient Name: JIAN BERMUDEZ
 
Encounter #: [id number]
Page [id number]
 
 
 
 
 
Electronically Signed by JOSE ELIAS SALAS on 19 at 0805
 
 
 
 
 
 
**All edits/amendments must be made on the electronic document**
 
DICTATION DATE: 19     : ARY  19 08     
RPT#: [id number]                                DC DATE:19
                                               STATUS: DIS IN  
[facility]
 [place] [address]
***END OF REPORT***

## 2022-06-24 NOTE — NUR
Problem: Adult Inpatient Plan of Care  Goal: Plan of Care Review  Outcome: Ongoing, Progressing   Updated care plan.  Chart review complete.   Patient Name: JIAN BERMUDEZ Admission Status: Elective
Accout number: [id number] Admission Date: 2017
: 1935 Admission Diagnosis:THROMBOSIS DUE TO VASCULAR PROSTH
DEV/GRBEV, INIT
Attending: AVILA HENDERSON Current LOS: 3
 
Anticipated DC Date: 2017
Planned Disposition: Home
Primary Insurance: MEDICARE A & B
 
 
Discharge Planning Comments:
* Is the patient Alert and Oriented? Yes 0
* How many steps to enter\exit or inside your home? NONE 0
* PCP DR. JESS CERVANTES 0
* Pharmacy [facility] IN [place], [place]. 0
* Preadmission Environment Home with Family 0
* ADLs Independent 0
* Equipment Bedside Commode
Rolling Walker
Shower Chair 0
* Other Equipment [place] [employer] - PREFERRED EQUIPMENT PROVIDER 0
* List name and contact numbers for known caregivers / representatives who
currently or will assist patient after discharge: WHITNEY COFFMAN, DTR,
[phone]
[employer], DTR, [phone] 0
* Community resources currently utilized Other 0
* Please name any agencies selected above. [facility], [place]
[facility] [place], 0630AM. MEDICAID TRANSPORT ($6 ROUND TRIP) 0
* Additional services required to return to the preadmission environment? No 0
* Can the patient safely return to the preadmission environment? Yes 0
* Has this patient been hospitalized within the prior 30 days at any hospital?
No 0
 
CM MET WITH PT AND DAUGHTER IN ROOM TO DISCUSS DISCHARGE PLANNING AND NEEDS.
PT REPORTS LIVING AT HOME INDEPENDENTLY WITH HER SPOUSE. PT HAS ALL NEEDED
MEDICAL EQUIPMENT AND NO OUTSIDE SERVICES ASSISTING IN THE HOME. PT GOES [facility] 0630AM SCHEDULE, MEDICAID TRANSPORTATION. CM DISCUSSED
AVAILABILITY OF HOME HEALTH, REHAB SERVICES AND MEDICAL EQUIPMENT. PT DENIES
DISCHARGE NEEDS, REPORTS HER DAUGHTER IS HERE TO PICK HER UP FOR DISCHARGE
HOME. IMPORTANT MESSAGE FROM MEDICARE PROVIDED AND EXPLAINED.
 
: Giuseppe Lambert